# Patient Record
Sex: FEMALE | Race: WHITE | NOT HISPANIC OR LATINO | Employment: FULL TIME | URBAN - NONMETROPOLITAN AREA
[De-identification: names, ages, dates, MRNs, and addresses within clinical notes are randomized per-mention and may not be internally consistent; named-entity substitution may affect disease eponyms.]

---

## 2024-04-15 ENCOUNTER — TRANSCRIBE ORDERS (OUTPATIENT)
Dept: PHYSICAL THERAPY | Facility: CLINIC | Age: 54
End: 2024-04-15
Payer: OTHER MISCELLANEOUS

## 2024-04-15 DIAGNOSIS — M25.562 LEFT KNEE PAIN, UNSPECIFIED CHRONICITY: Primary | ICD-10-CM

## 2024-04-15 DIAGNOSIS — M25.572 PAIN IN LEFT ANKLE AND JOINTS OF LEFT FOOT: ICD-10-CM

## 2024-04-15 DIAGNOSIS — M25.571 PAIN IN RIGHT ANKLE AND JOINTS OF RIGHT FOOT: ICD-10-CM

## 2024-04-17 ENCOUNTER — TRANSCRIBE ORDERS (OUTPATIENT)
Dept: ADMINISTRATIVE | Facility: HOSPITAL | Age: 54
End: 2024-04-17
Payer: OTHER MISCELLANEOUS

## 2024-04-17 DIAGNOSIS — M25.562 LEFT KNEE PAIN, UNSPECIFIED CHRONICITY: ICD-10-CM

## 2024-04-17 DIAGNOSIS — M25.662 DECREASED RANGE OF MOTION (ROM) OF LEFT KNEE: Primary | ICD-10-CM

## 2024-04-22 ENCOUNTER — HOSPITAL ENCOUNTER (OUTPATIENT)
Dept: MRI IMAGING | Facility: HOSPITAL | Age: 54
Discharge: HOME | End: 2024-04-22
Admitting: NURSE PRACTITIONER
Payer: OTHER MISCELLANEOUS

## 2024-04-22 DIAGNOSIS — M25.662 DECREASED RANGE OF MOTION (ROM) OF LEFT KNEE: ICD-10-CM

## 2024-04-22 DIAGNOSIS — M25.562 LEFT KNEE PAIN, UNSPECIFIED CHRONICITY: ICD-10-CM

## 2024-04-22 PROCEDURE — 73721 MRI JNT OF LWR EXTRE W/O DYE: CPT

## 2024-04-26 ENCOUNTER — TREATMENT (OUTPATIENT)
Dept: PHYSICAL THERAPY | Facility: CLINIC | Age: 54
End: 2024-04-26
Payer: OTHER MISCELLANEOUS

## 2024-04-26 DIAGNOSIS — M25.572 ACUTE LEFT ANKLE PAIN: ICD-10-CM

## 2024-04-26 DIAGNOSIS — M25.562 ACUTE PAIN OF LEFT KNEE: Primary | ICD-10-CM

## 2024-04-26 NOTE — PROGRESS NOTES
Physical Therapy Initial Evaluation and Plan of Care  [address] [place] [address]      Patient: Chucky Wallis   : 1970  Diagnosis/ICD-10 Code:  Acute pain of left knee [M25.562]  Referring practitioner: Musa Holden Jr.*  Date of Initial Visit: 2024  Today's Date: 2024  Patient seen for 1 session         Visit Diagnoses:    ICD-10-CM ICD-9-CM   1. Acute pain of left knee  M25.562 719.46   2. Acute left ankle pain  M25.572 719.47         Subjective Questionnaire: LEFS:       Subjective Evaluation    History of Present Illness  Mechanism of injury: On  pt was walking to the time clock to clock out for lunch. Leaving from the time clock there were 2 boards sticking out from under sheet metal and she tripped over it and fell forward straight down onto her knees. Felt a pull and then a pop. Also hurt both ankles L worse than the R. Can not raise her leg up (marching) feels pain on the sides of the leg. Wears brace only when out. Follows up in 3 weeks. Notes pain into the buttock on the L leg. Achy when sitting with weight on the leg. Has tingling when the knee is bent. When walking leg feels bruised. Difficulty pivoting, turning foot is easier than pivoting. 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   Precautions and Work Restrictions: light dutyQuality of life: fair    Pain  At best pain ratin  At worst pain rating: 3  Quality: dull ache (numbness,tingling)  Relieving factors: support and rest  Aggravating factors: lifting, movement, stairs, standing, ambulation, prolonged positioning, squatting and repetitive movement  Progression: no change    Social Support  Lives in: multiple-level home (living on the first floor)  Lives with: spouse and parents    Diagnostic Tests  MRI studies: normal    Patient Goals  Patient goals for therapy: decreased pain, increased motion, increased strength  and return to work             Objective          Observations     Additional Knee Observation Details  Pt sits without weight on L hip when in chair but sits with weight on hip when on the mat  Pt with increased hip ER in supine    Palpation   Left   Tenderness of the distal biceps femoris, distal semimembranosus and distal semitendinosus.     Tenderness   Left Knee   Tenderness in the fibular head, inferior patella, ITB, lateral joint line and lateral patella.     Active Range of Motion   Left Knee   Flexion: 140 degrees   Extension: 0 degrees with pain    Right Knee   Flexion: 130 degrees   Extension: 0 degrees     Patellar Static Positioning   Left Knee: lateral tilt  Right Knee: lateral tilt    Strength/Myotome Testing     Left Knee   Quadriceps contraction: good    Right Knee   Flexion: 5  Extension: 5  Quadriceps contraction: good    Additional Strength Details  Hip flexion R 5/5  Strength testing L 3+/5 to 4+/5 variable    Ambulation     Observational Gait   Gait: antalgic   Decreased left stance time and left step length.   Left foot contact pattern: foot flat     General Comments     Knee Comments  Hamstring length L 0 in a 90 90         Assessment & Plan       Assessment  Impairments: abnormal gait, abnormal muscle firing, abnormal muscle tone, activity intolerance, impaired physical strength, pain with function and weight-bearing intolerance   Functional limitations: lifting, walking, uncomfortable because of pain, moving in bed, sitting, standing, stooping and unable to perform repetitive tasks   Assessment details: Pt is a 52 YO F who presents to the clinic with L knee and ankle pain following a fall at work. Pt with signs and symptoms consistent with a strain of the L hamstring and peroneals as well as possible peroneal nerve injury. Pt will benefit from skilled PT for decreasing L knee/ankle pain and improving L knee/ankle ROM and strength to aid with return to normal daily activities.  Barriers to  therapy: workers comp, severity  Prognosis: fair    Goals  Plan Goals: SHORT TERM GOALS:  4 weeks       1. Pt independent with HEP  2. Pt to demonstrate L LE strength of 4/5 or greater consistently to improve stability with ambulation  3. Pt to report being able to walk for 10 minutes without increasing pain in the left knee    LONG TERM GOALS:   8 weeks  1. Pt to demonstrate ability to perform full functional squat with good form and control of the knees and without increasing pain  2. Pt to demonstrate L LE strength to 4+/5 or greater to improve safety with ambulation on uneven surfaces  3. Pt to return to work full duty without increased pain in the left knee   4. Pt to demonstrate ability to perform step up/down 8 inch step x10 safely and without pain in the left knee        Plan  Therapy options: will be seen for skilled therapy services  Planned modality interventions: cryotherapy  Planned therapy interventions: abdominal trunk stabilization, body mechanics training, flexibility, gait training, home exercise program, joint mobilization, manual therapy, neuromuscular re-education, soft tissue mobilization, strengthening, stretching and therapeutic activities  Frequency: 2x week  Duration in weeks: 8  Treatment plan discussed with: patient        History # of Personal Factors and/or Comorbidities: MODERATE (1-2)  Examination of Body System(s): # of elements: LOW (1-2)  Clinical Presentation: STABLE   Clinical Decision Making: LOW       Timed:         Manual Therapy:         mins  06575;     Therapeutic Exercise: 11        mins  74814;     Neuromuscular Ladi:       mins  51742;    Therapeutic Activity:          mins  20724;     Gait Training:          mins  72251;     Ultrasound:          mins  74157;    Ionto                                   mins   76320  Self Care                            mins   49669  Canalith Repos         mins 79031      Un-Timed:  Electrical Stimulation:         mins  20360 (  );  Dry Needling          mins self-pay  Traction          mins 94380  Low Eval   32      Mins  56615  Mod Eval          Mins  58501  High Eval                            Mins  63358        Timed Treatment:   11   mins   Total Treatment:     43   mins      PT: Stephanie Sanz PT     License Number: [id number]    Electronically signed by Stephanie Sanz PT, 04/26/24, 10:34 AM EDT    Certification Period: 4/26/2024 thru 7/24/2024  I certify that the therapy services are furnished while this patient is under my care.  The services outlined above are required by this patient, and will be reviewed every 90 days.         Physician Signature:__________________________________________________    PHYSICIAN: Musa Holden Jr., DORON  NPI: [id number]      DATE:     Please sign and return via fax to [email or web address] . Thank you, [facility].

## 2024-04-30 ENCOUNTER — TREATMENT (OUTPATIENT)
Dept: PHYSICAL THERAPY | Facility: CLINIC | Age: 54
End: 2024-04-30
Payer: OTHER MISCELLANEOUS

## 2024-04-30 DIAGNOSIS — M25.572 ACUTE LEFT ANKLE PAIN: ICD-10-CM

## 2024-04-30 DIAGNOSIS — M25.562 ACUTE PAIN OF LEFT KNEE: Primary | ICD-10-CM

## 2024-04-30 NOTE — PROGRESS NOTES
Physical Therapy Daily Treatment Note  [address] [place] [address]      Patient: Chucky Wallis   : 1970  Referring practitioner: Musa Holden Jr.*  Date of Initial Visit: Type: THERAPY  Noted: 2024  Today's Date: 2024  Patient seen for 2 sessions         Visit Diagnoses:    ICD-10-CM ICD-9-CM   1. Acute pain of left knee  M25.562 719.46   2. Acute left ankle pain  M25.572 719.47       Subjective   Patient reports numbness and tingling is still there. Had a pinching in the back of the knee while walking around. Knee and ankle are really sore. Rates pain of 7/10 at worst. Having difficulty doing the exercises because of time.     Objective   See Exercise, Manual, and Modality Logs for complete treatment.   Knee brace on    Assessment/Plan  Pt with minimal reports of pain during treatment today and pain improved when pt was cued for decreased exercise intensity. Educated pt to break the exercises up through the day and to try to fit them in as best she can. Does demonstrate improving hip flexion. Educated pt to ice when she got home as pt declined ice in the clinic. Continue with current POT progressing pt per tolerance.      Timed:         Manual Therapy:   14      mins  47157;     Therapeutic Exercise:    39     mins  81443;     Neuromuscular Ladi:      mins  76847;    Therapeutic Activity:          mins  82226;     Gait Training:           mins  03056;     Ultrasound:          mins  32857;    Ionto                                   mins   43604  Self Care                            mins   31033  Canalith Repos         mins 17174      Un-Timed:  Electrical Stimulation:        mins  79092 ( );  Dry Needling          mins self-pay  Traction          mins 63783      Timed Treatment:   53   mins   Total Treatment:     55   mins    Stephanie Sanz, PT  [place] License: [id number]

## 2024-05-03 ENCOUNTER — TREATMENT (OUTPATIENT)
Dept: PHYSICAL THERAPY | Facility: CLINIC | Age: 54
End: 2024-05-03
Payer: OTHER MISCELLANEOUS

## 2024-05-03 DIAGNOSIS — M25.572 ACUTE LEFT ANKLE PAIN: ICD-10-CM

## 2024-05-03 DIAGNOSIS — M25.562 ACUTE PAIN OF LEFT KNEE: Primary | ICD-10-CM

## 2024-05-03 NOTE — PROGRESS NOTES
Physical Therapy Daily Treatment Note  [address] [place] [address]      Patient: Chucky Wallis   : 1970  Referring practitioner: Musa Holden Jr.*  Date of Initial Visit: Type: THERAPY  Noted: 2024  Today's Date: 5/3/2024  Patient seen for 3 sessions         Visit Diagnoses:    ICD-10-CM ICD-9-CM   1. Acute pain of left knee  M25.562 719.46   2. Acute left ankle pain  M25.572 719.47       Subjective   Patient reports that she was at work and a metal danielle fell on her L ankle. Came to [facility] and they told her she has a compound bruise. Notes the knee is slightly improved. States the ankle is mostly tender at the scrape. Still getting numbness and tingling in the outside of the leg. Reports she was given a boot to wear but it is making her leg swell more so she is not wearing it except at work where she has to.    Objective   See Exercise, Manual, and Modality Logs for complete treatment.   Observation pt with large scrape on the medial malleolus    Assessment/Plan  Pt with minimal reports of pain during treatment. Educated pt regarding use of ankle ROM to aid with tenderness and continued use of HEP for improving the knee. Discussed considering wearing work boots since she is in an environment where things can fall on her feet/ankles. Also discussed attempting normal gait pattern to aid with compensatory pains. Continue with current POT progressing pt per tolerance.    Timed:         Manual Therapy:   12      mins  43096;     Therapeutic Exercise:    41     mins  47586;     Neuromuscular Ladi:      mins  53976;    Therapeutic Activity:          mins  68616;     Gait Training:           mins  61697;     Ultrasound:          mins  61528;    Ionto                                   mins   47628  Self Care                            mins   96500  Canalith Repos         mins 40071      Un-Timed:  Electrical Stimulation:        mins  16395 ( );  Dry Needling          mins  self-pay  Traction          mins 91637      Timed Treatment:   53   mins   Total Treatment:     63   mins    Stephanie Sanz, PT  [place] License: [id number]

## 2024-05-07 ENCOUNTER — TREATMENT (OUTPATIENT)
Dept: PHYSICAL THERAPY | Facility: CLINIC | Age: 54
End: 2024-05-07
Payer: OTHER MISCELLANEOUS

## 2024-05-07 DIAGNOSIS — M25.562 ACUTE PAIN OF LEFT KNEE: Primary | ICD-10-CM

## 2024-05-07 DIAGNOSIS — M25.572 ACUTE LEFT ANKLE PAIN: ICD-10-CM

## 2024-05-10 ENCOUNTER — TREATMENT (OUTPATIENT)
Dept: PHYSICAL THERAPY | Facility: CLINIC | Age: 54
End: 2024-05-10
Payer: OTHER MISCELLANEOUS

## 2024-05-10 DIAGNOSIS — M25.562 ACUTE PAIN OF LEFT KNEE: Primary | ICD-10-CM

## 2024-05-10 DIAGNOSIS — M25.572 ACUTE LEFT ANKLE PAIN: ICD-10-CM

## 2024-05-14 ENCOUNTER — TREATMENT (OUTPATIENT)
Dept: PHYSICAL THERAPY | Facility: CLINIC | Age: 54
End: 2024-05-14
Payer: OTHER MISCELLANEOUS

## 2024-05-14 DIAGNOSIS — M25.562 ACUTE PAIN OF LEFT KNEE: Primary | ICD-10-CM

## 2024-05-14 DIAGNOSIS — M25.572 ACUTE LEFT ANKLE PAIN: ICD-10-CM

## 2024-05-14 NOTE — PROGRESS NOTES
Physical Therapy Daily Treatment Note  [address] [place] [address]      Patient: Chucky Wallis   : 1970  Referring practitioner: Musa Holden Jr.*  Date of Initial Visit: Type: THERAPY  Noted: 2024  Today's Date: 2024  Patient seen for 6 sessions         Visit Diagnoses:    ICD-10-CM ICD-9-CM   1. Acute pain of left knee  M25.562 719.46   2. Acute left ankle pain  M25.572 719.47       Subjective   Patient reports pain mostly in the groin area (adductor) and buttock area (piriformis). Ankle  and has the numbness tingling. Starting to feel it in the arch of the foot as a cold type of sensation and tingling sensation.     Objective   See Exercise, Manual, and Modality Logs for complete treatment.     Assessment/Plan  Pt with minimal reports of pain during treatment today.  Patient's report of pain has moved into different muscles than she originally started with so new exercises were added today to address what is most likely compensatory pains.  Educated patient that her altered gait mechanics may be responsible for these new pains in her abductors and piriformis.  Patient reported no change in symptoms today with peroneal nerve glide.  Continue with current POT progressing patient per tolerance.    Timed:         Manual Therapy: 10        mins  32032;     Therapeutic Exercise:     43   mins  54552;     Neuromuscular Ladi:      mins  40367;    Therapeutic Activity:          mins  24907;     Gait Training:           mins  24941;     Ultrasound:          mins  48557;    Ionto                                   mins   56934  Self Care                            mins   70934  Canalith Repos         mins 37329      Un-Timed:  Electrical Stimulation:        mins  46985 ( );  Dry Needling          mins self-pay  Traction          mins 75008      Timed Treatment:   53   mins   Total Treatment:     53   mins    Stephanie Sanz, PT  [place] License: [id number]

## 2025-05-19 ENCOUNTER — APPOINTMENT (OUTPATIENT)
Facility: HOSPITAL | Age: 55
End: 2025-05-19
Payer: MEDICAID

## 2025-05-19 ENCOUNTER — HOSPITAL ENCOUNTER (EMERGENCY)
Facility: HOSPITAL | Age: 55
Discharge: HOME | End: 2025-05-19
Attending: STUDENT IN AN ORGANIZED HEALTH CARE EDUCATION/TRAINING PROGRAM | Admitting: EMERGENCY MEDICINE
Payer: MEDICAID

## 2025-05-19 VITALS
SYSTOLIC BLOOD PRESSURE: 123 MMHG | OXYGEN SATURATION: 95 % | BODY MASS INDEX: 35.43 KG/M2 | HEART RATE: 67 BPM | WEIGHT: 220.46 LBS | TEMPERATURE: 98.5 F | DIASTOLIC BLOOD PRESSURE: 68 MMHG | RESPIRATION RATE: 20 BRPM | HEIGHT: 66 IN

## 2025-05-19 DIAGNOSIS — M54.50 LUMBAR PAIN: ICD-10-CM

## 2025-05-19 DIAGNOSIS — M25.512 ACUTE PAIN OF LEFT SHOULDER: Primary | ICD-10-CM

## 2025-05-19 PROCEDURE — 96372 THER/PROPH/DIAG INJ SC/IM: CPT

## 2025-05-19 PROCEDURE — 70450 CT HEAD/BRAIN W/O DYE: CPT

## 2025-05-19 PROCEDURE — 73030 X-RAY EXAM OF SHOULDER: CPT

## 2025-05-19 PROCEDURE — 99284 EMERGENCY DEPT VISIT MOD MDM: CPT | Performed by: STUDENT IN AN ORGANIZED HEALTH CARE EDUCATION/TRAINING PROGRAM

## 2025-05-19 PROCEDURE — 72128 CT CHEST SPINE W/O DYE: CPT

## 2025-05-19 PROCEDURE — 72131 CT LUMBAR SPINE W/O DYE: CPT

## 2025-05-19 PROCEDURE — 25010000002 KETOROLAC TROMETHAMINE PER 15 MG

## 2025-05-19 PROCEDURE — 72125 CT NECK SPINE W/O DYE: CPT

## 2025-05-19 RX ORDER — CYCLOBENZAPRINE HCL 10 MG
10 TABLET ORAL 3 TIMES DAILY PRN
Qty: 15 TABLET | Refills: 0 | Status: SHIPPED | OUTPATIENT
Start: 2025-05-19

## 2025-05-19 RX ORDER — KETOROLAC TROMETHAMINE 30 MG/ML
30 INJECTION, SOLUTION INTRAMUSCULAR; INTRAVENOUS ONCE
Status: COMPLETED | OUTPATIENT
Start: 2025-05-19 | End: 2025-05-19

## 2025-05-19 RX ORDER — TRIAMTERENE AND HYDROCHLOROTHIAZIDE 37.5; 25 MG/1; MG/1
1 CAPSULE ORAL EVERY MORNING
COMMUNITY

## 2025-05-19 RX ADMIN — KETOROLAC TROMETHAMINE 30 MG: 30 INJECTION, SOLUTION INTRAMUSCULAR; INTRAVENOUS at 19:48

## 2025-05-19 NOTE — Clinical Note
[facility] EMERGENCY DEPARTMENT [place]  [address]  [facility] [address]  Phone: [phone]  Fax: [phone]    Chucky Wallis was seen and treated in our emergency department on 5/19/2025.  She may return to work on 05/22/2025.         Thank you for choosing [facility].    Sandy Ann RN
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
